# Patient Record
(demographics unavailable — no encounter records)

---

## 2025-03-10 NOTE — PLAN
[FreeTextEntry1] : annual: pap and hpv neg so not needed  Risks, benefits, alternative to combined oral contraceptives discussed. Patient told to carefully read package insert. *on pill for both acne and painful periods  female partner: getting  this summer *may do sperm donor in about 3 yrs, issues discussed had blood work this summer:all good

## 2025-03-10 NOTE — HISTORY OF PRESENT ILLNESS
[No] : never pregnant [Currently Active] : currently active [Women] : women [FreeTextEntry1] : Bianca Meghan presents for well woman with gyn exam.